# Patient Record
Sex: FEMALE | Race: AMERICAN INDIAN OR ALASKA NATIVE | NOT HISPANIC OR LATINO | Employment: UNEMPLOYED | URBAN - METROPOLITAN AREA
[De-identification: names, ages, dates, MRNs, and addresses within clinical notes are randomized per-mention and may not be internally consistent; named-entity substitution may affect disease eponyms.]

---

## 2017-01-29 ENCOUNTER — HOSPITAL ENCOUNTER (EMERGENCY)
Facility: MEDICAL CENTER | Age: 32
End: 2017-01-30
Attending: EMERGENCY MEDICINE
Payer: MEDICAID

## 2017-01-29 DIAGNOSIS — L03.114 CELLULITIS OF LEFT UPPER EXTREMITY: ICD-10-CM

## 2017-01-29 DIAGNOSIS — F19.10 DRUG ABUSE, IV (HCC): ICD-10-CM

## 2017-01-29 PROCEDURE — 99284 EMERGENCY DEPT VISIT MOD MDM: CPT

## 2017-01-29 ASSESSMENT — PAIN SCALES - GENERAL: PAINLEVEL_OUTOF10: 4

## 2017-01-29 NOTE — ED AVS SNAPSHOT
Gigabit Squared Access Code: [email or web address]  Expires: 3/1/2017 12:33 AM    Gigabit Squared  A secure, online tool to manage your health information     [employer]’s Gigabit Squared® is a secure, online tool that connects you to your personalized health information from the privacy of your home -- day or night - making it very easy for you to manage your healthcare. Once the activation process is completed, you can even access your medical information using the Gigabit Squared feli, which is available for free in the Apple Feli store or Google Play store.     Gigabit Squared provides the following levels of access (as shown below):   My Chart Features   [facility] Primary Care Doctor [facility]  Specialists [facility]  Urgent  Care Non-[facility]  Primary Care  Doctor   Email your healthcare team securely and privately 24/7 X X X X   Manage appointments: schedule your next appointment; view details of past/upcoming appointments X      Request prescription refills. X      View recent personal medical records, including lab and immunizations X X X X   View health record, including health history, allergies, medications X X X X   Read reports about your outpatient visits, procedures, consult and ER notes X X X X   See your discharge summary, which is a recap of your hospital and/or ER visit that includes your diagnosis, lab results, and care plan. X X       How to register for Gigabit Squared:  1. Go to  [email or web address].  2. Click on the Sign Up Now box, which takes you to the New Member Sign Up page. You will need to provide the following information:  a. Enter your Gigabit Squared Access Code exactly as it appears at the top of this page. (You will not need to use this code after you’ve completed the sign-up process. If you do not sign up before the expiration date, you must request a new code.)   b. Enter your date of birth.   c. Enter your home email address.   d. Click Submit, and follow the next screen’s instructions.  3. Create a Gigabit Squared ID. This will be your Gigabit Squared  login ID and cannot be changed, so think of one that is secure and easy to remember.  4. Create a [employer] password. You can change your password at any time.  5. Enter your Password Reset Question and Answer. This can be used at a later time if you forget your password.   6. Enter your e-mail address. This allows you to receive e-mail notifications when new information is available in ImageVision.  7. Click Sign Up. You can now view your health information.    For assistance activating your ImageVision account, call [phone]

## 2017-01-29 NOTE — ED AVS SNAPSHOT
1/30/2017          Aries Jaimes  [address] [place] [address]    Dear Aries:    [facility] wants to ensure your discharge home is safe and you or your loved ones have had all your questions answered regarding your care after you leave the hospital.    You may receive a telephone call within two days of your discharge.  This call is to make certain you understand your discharge instructions as well as ensure we provided you with the best care possible during your stay with us.     The call will only last approximately 3-5 minutes and will be done by a nurse.    Once again, we want to ensure your discharge home is safe and that you have a clear understanding of any next steps in your care.  If you have any questions or concerns, please do not hesitate to contact us, we are here for you.  Thank you for choosing [facility] for your healthcare needs.    Sincerely,    Nav Mckeon    [facility]

## 2017-01-29 NOTE — ED AVS SNAPSHOT
After Visit Summary                                                                                                                Aries Jaimes   MRN: [id number]    Department:  [facility], Emergency Dept   Date of Visit:  1/29/2017            [facility], Emergency Dept    [address] [place] [id number]    Phone:  [phone]      You were seen by     Lisandro Johnson M.D.      Your Diagnosis Was     Cellulitis of left upper extremity     L03.114       Follow-up Information     1. Follow up with [facility], Emergency Dept.    Specialty:  Emergency Medicine    Why:  As needed    Contact information    [facility] [address] [phone]      Medication Information     Review all of your home medications and newly ordered medications with your primary doctor and/or pharmacist as soon as possible. Follow medication instructions as directed by your doctor and/or pharmacist.     Please keep your complete medication list with you and share with your physician. Update the information when medications are discontinued, doses are changed, or new medications (including over-the-counter products) are added; and carry medication information at all times in the event of emergency situations.               Medication List      START taking these medications        Instructions    Cephalexin 500 MG Tabs    Take 1 Tab by mouth every 6 hours.   Dose:  500 mg       sulfamethoxazole-trimethoprim 800-160 MG tablet   Commonly known as:  BACTRIM DS    Take 1 Tab by mouth 2 times a day.   Dose:  1 Tab               Procedures and tests performed during your visit     NURSING COMMUNICATION        Discharge Instructions       Cellulitis    Start antibiotics cephalexin tomorrow.  Elevate the affected area above the heart if possible.  Return for redness spreading more than 1-2 inches, red streaks, ill appearance or formation of abscess.  Keep any open areas  bandaged to prevent contagion.  If not better 2-3 days start the 2nd antibiotic Bactrim. Stop using methamphetamines.    Your caregiver has diagnosed you or your child as having cellulitis. Cellulitis is an infection of the skin and the tissue beneath it. The area is typically red and tender. It is caused by bacteria (germs) (usually staph or strep) that enter the body through cuts or sores. Cellulitis most commonly occurs in the arms and/or lower legs.      HOME CARE INSTRUCTIONS  Ø If you are given a prescription for antibiotics (medications which kill germs), take as directed until finished.  Ø If the infection is on the arm or leg, keep the limb elevated as able.  Ø Use a heating pad several times per day to relieve pain and encourage healing. Do not sleep with heating pad. If you are diabetic, do not use a heating pad unless instructed to do so.  Ø See your caregiver for a recheck of the infected site in 2 days, or sooner if problems arise.  Ø Use acetaminophen (Tylenol®) or ibuprofen (Advil® or Motrin®) as needed for relief of fever, pain and discomfort.    seek medical care if:  Ø An oral temperature above 102° F (38.9° C) develops, or as your caregiver suggests, not controlled by medication.  Ø The area of redness is spreading, there are red streaks coming from the infected site, or if a part of the infection begins to turn dark in color.  Ø The joint or bone underneath the infected skin becomes painful after the skin has healed.  Ø The infection returns in the same or another area after it seems to have gone away.  Ø A boil or bump swells up. This may be an abscess.  Ø New, unexplained symptoms (problems) such as pain or fever develop.    seek immediate medical care if:   Ø You or your child feel drowsy or lethargic.  Ø There is vomiting, diarrhea, or generalized malaise with muscle aches and pains.    Document Released: 09/27/2006  Document Re-Released: 01/29/2008  [facility]® Patient Information ©2008  [email or web address]             Patient Information     Patient Information    Following emergency treatment: all patient requiring follow-up care must return either to a private physician or a clinic if your condition worsens before you are able to obtain further medical attention, please return to the emergency room.     Billing Information    At [facility], we work to make the billing process streamlined for our patients.  Our Representatives are here to answer any questions you may have regarding your hospital bill.  If you have insurance coverage and have supplied your insurance information to us, we will submit a claim to your insurer on your behalf.  Should you have any questions regarding your bill, we can be reached online or by phone as follows:  Online: You are able pay your bills online or live chat with our representatives about any billing questions you may have. We are here to help Monday - Friday from 8:00am to 7:30pm and 9:00am - 12:00pm on Saturdays.  Please visit [email or web address]  for more information.   Phone:  [phone] or [phone]    Please note that your emergency physician, surgeon, pathologist, radiologist, anesthesiologist, and other specialists are not employed by [facility] and will therefore bill separately for their services.  Please contact them directly for any questions concerning their bills at the numbers below:     Emergency Physician Services:  [phone]  [facility]:  [phone]  [facility]:  [phone]  [facility]:  [phone]    1. Your final bill may vary from the amount quoted upon discharge if all procedures are not complete at that time, or if your doctor has additional procedures of which we are not aware. You will receive an additional bill if you return to the Emergency Department at [facility] for suture removal regardless of the facility of which the sutures were  placed.     2. Please arrange for settlement of this account at the emergency registration.    3. All self-pay accounts are due in full at the time of treatment.  If you are unable to meet this obligation then payment is expected within 4-5 days.     4. If you have had radiology studies (CT, X-ray, Ultrasound, MRI), you have received a preliminary result during your emergency department visit. Please contact the radiology department [phone] to receive a copy of your final result. Please discuss the Final result with your primary physician or with the follow up physician provided.     Crisis Hotline:  [place] Crisis Hotline:  2-172-CWRTNFO or 1-857.543.3911  [place] Crisis Hotline:    1-653.944.6079 or 983-950-1691         ED Discharge Follow Up Questions    1. In order to provide you with very good care, we would like to follow up with a phone call in the next few days.  May we have your permission to contact you?     YES /  NO    2. What is the best phone number to call you? (       )_____-__________    3. What is the best time to call you?      Morning  /  Afternoon  /  Evening                   Patient Signature:  ____________________________________________________________    Date:  ____________________________________________________________

## 2017-01-30 VITALS
BODY MASS INDEX: 35.54 KG/M2 | HEIGHT: 66 IN | RESPIRATION RATE: 16 BRPM | TEMPERATURE: 97.3 F | HEART RATE: 88 BPM | SYSTOLIC BLOOD PRESSURE: 128 MMHG | OXYGEN SATURATION: 98 % | WEIGHT: 221.12 LBS | DIASTOLIC BLOOD PRESSURE: 72 MMHG

## 2017-01-30 PROCEDURE — 700102 HCHG RX REV CODE 250 W/ 637 OVERRIDE(OP): Performed by: EMERGENCY MEDICINE

## 2017-01-30 PROCEDURE — 700101 HCHG RX REV CODE 250: Performed by: EMERGENCY MEDICINE

## 2017-01-30 PROCEDURE — A9270 NON-COVERED ITEM OR SERVICE: HCPCS | Performed by: EMERGENCY MEDICINE

## 2017-01-30 RX ORDER — LIDOCAINE HYDROCHLORIDE AND EPINEPHRINE BITARTRATE 20; .01 MG/ML; MG/ML
10 INJECTION, SOLUTION SUBCUTANEOUS ONCE
Status: COMPLETED | OUTPATIENT
Start: 2017-01-30 | End: 2017-01-30

## 2017-01-30 RX ORDER — LIDOCAINE HYDROCHLORIDE 10 MG/ML
20 INJECTION, SOLUTION INFILTRATION; PERINEURAL ONCE
Status: DISCONTINUED | OUTPATIENT
Start: 2017-01-30 | End: 2017-01-30 | Stop reason: HOSPADM

## 2017-01-30 RX ORDER — CEPHALEXIN 500 MG/1
500 TABLET ORAL EVERY 6 HOURS
Qty: 28 TAB | Refills: 0 | Status: SHIPPED | OUTPATIENT
Start: 2017-01-30 | End: 2018-06-08

## 2017-01-30 RX ORDER — CEPHALEXIN 500 MG/1
500 CAPSULE ORAL ONCE
Status: COMPLETED | OUTPATIENT
Start: 2017-01-30 | End: 2017-01-30

## 2017-01-30 RX ORDER — SULFAMETHOXAZOLE AND TRIMETHOPRIM 800; 160 MG/1; MG/1
1 TABLET ORAL 2 TIMES DAILY
Qty: 14 TAB | Refills: 0 | Status: SHIPPED | OUTPATIENT
Start: 2017-01-30 | End: 2018-06-08

## 2017-01-30 RX ADMIN — LIDOCAINE HYDROCHLORIDE AND EPINEPHRINE 10 ML: 20; 10 INJECTION, SOLUTION INFILTRATION; PERINEURAL at 00:33

## 2017-01-30 RX ADMIN — CEPHALEXIN 500 MG: 500 CAPSULE ORAL at 01:07

## 2017-01-30 NOTE — ED NOTES
Redness to left arm marked with black ink. Dressing applied to open would on left inner arm and lower arm were MD aspirated. Pt has been educated to stop using IV drugs, if she will not to find access to clean needles, she has been educated NOT to inject into that arm. She has been instructed on antibiotics and to return to ED for worsening s/s. She has been provided her DC paperwork, prescriptions and when to follow up instructions. She ambulated to [facility] without assistance.

## 2017-01-30 NOTE — ED NOTES
Aries Jaimes  31 y.o. female  Chief Complaint   Patient presents with   • Abscess     X 2 on left forearm     Pt amb to triage with steady gait for above complaint. Per pt, pt has had an abscess X 1 week. Per pt, pt is an IV drug user. Pt admits to using about a week ago in the same areas as abscesses.  Pt placed in lobby. Pt educated on triage process. Pt encouraged to alert staff for any changes.

## 2017-01-30 NOTE — ED PROVIDER NOTES
"ED Provider Note    CHIEF COMPLAINT   Chief Complaint   Patient presents with   • Abscess     X 2 on left forearm       HPI   Aries Jaimes is a 31 y.o. female who presents for 2 areas of swelling on left forearm. The areas been red for about 10 days. Patient injected methamphetamines there. One lesion ruptured and the discharge was apparent. Patient denies fever or flulike symptoms nausea or body aches. No other rash. She is trying to quit meth use. She does not have pain in the forearm.    REVIEW OF SYSTEMS   Pertinent positives include: Left arm redness with 2 areas of swelling.  Pertinent negatives include: HIV, endocarditis, hepatitis C, needle sharing, pregnancy.     PAST MEDICAL HISTORY   IV drug use    CURRENT MEDICATIONS   Tetanus up-to-date at 4 years    ALLERGIES   No Known Allergies    PHYSICAL EXAM  VITAL SIGNS: /80 mmHg  Pulse 93  Temp(Src) 36 °C (96.8 °F)  Resp 16  Ht 1.676 m (5' 6\")  Wt 100.3 kg (221 lb 1.9 oz)  BMI 35.71 kg/m2  SpO2 98%  Constitutional: Well developed, Well nourished, borderline blood pressure elevation, well-appearing.   HENT: Atraumatic.  Respiratory: Rate and excursion normal.  Cardiovascular: No murmur, rub, gallop  Musculoskeletal: No bony deformity.  Skin: Palm-sized patch of erythema left volar forearm. Ruptured abscess no longer draining but with some surrounding induration. 2 cm indurated nonfluctuant and nontender nodule distal forearm.  Neurologic: Nonfocal.     COURSE & MEDICAL DECISION MAKING  Plan ruptured lesion prepped with alcohol prep and anesthetized with local infiltration of 2 mL of 2% lidocaine with epinephrine. Needle aspiration performed and no purulence found.    This patient presents with a left forearm cellulitis to to IV drug abuse. She did have an abscess which spontaneously and appears to have completely drained. There is no evidence of endocarditis and no history of HIV or hepatitis C.    PLAN:   Discontinue methamphetamine " abuse  Patient guardian called an outpatient support groups  Keflex 1st dose given here  If no improvement 2 days add Bactrim  Return for worsening infection abscess or ill appearance  Cellulitis handout  Arm elevation    CONDITION: good    FINAL IMPRESSION  1. Cellulitis of left upper extremity    2. Drug abuse, IV            Electronically signed by: Lisandro Johnson, 1/30/2017 12:15 AM

## 2017-01-30 NOTE — DISCHARGE INSTRUCTIONS
Cellulitis    Start antibiotics cephalexin tomorrow.  Elevate the affected area above the heart if possible.  Return for redness spreading more than 1-2 inches, red streaks, ill appearance or formation of abscess.  Keep any open areas bandaged to prevent contagion.  If not better 2-3 days start the 2nd antibiotic Bactrim. Stop using methamphetamines.    Your caregiver has diagnosed you or your child as having cellulitis. Cellulitis is an infection of the skin and the tissue beneath it. The area is typically red and tender. It is caused by bacteria (germs) (usually staph or strep) that enter the body through cuts or sores. Cellulitis most commonly occurs in the arms and/or lower legs.      HOME CARE INSTRUCTIONS  Ø If you are given a prescription for antibiotics (medications which kill germs), take as directed until finished.  Ø If the infection is on the arm or leg, keep the limb elevated as able.  Ø Use a heating pad several times per day to relieve pain and encourage healing. Do not sleep with heating pad. If you are diabetic, do not use a heating pad unless instructed to do so.  Ø See your caregiver for a recheck of the infected site in 2 days, or sooner if problems arise.  Ø Use acetaminophen (Tylenol®) or ibuprofen (Advil® or Motrin®) as needed for relief of fever, pain and discomfort.    seek medical care if:  Ø An oral temperature above 102° F (38.9° C) develops, or as your caregiver suggests, not controlled by medication.  Ø The area of redness is spreading, there are red streaks coming from the infected site, or if a part of the infection begins to turn dark in color.  Ø The joint or bone underneath the infected skin becomes painful after the skin has healed.  Ø The infection returns in the same or another area after it seems to have gone away.  Ø A boil or bump swells up. This may be an abscess.  Ø New, unexplained symptoms (problems) such as pain or fever develop.    seek immediate medical care if:    Ø You or your child feel drowsy or lethargic.  Ø There is vomiting, diarrhea, or generalized malaise with muscle aches and pains.    Document Released: 09/27/2006  Document Re-Released: 01/29/2008  [employer]® Patient Information ©2008 [employer].

## 2017-03-22 ENCOUNTER — HOSPITAL ENCOUNTER (EMERGENCY)
Facility: MEDICAL CENTER | Age: 32
End: 2017-03-22
Payer: MEDICAID

## 2017-03-22 VITALS
HEIGHT: 66 IN | OXYGEN SATURATION: 98 % | DIASTOLIC BLOOD PRESSURE: 69 MMHG | TEMPERATURE: 98 F | SYSTOLIC BLOOD PRESSURE: 104 MMHG | HEART RATE: 98 BPM | RESPIRATION RATE: 16 BRPM

## 2017-03-22 PROCEDURE — 302449 STATCHG TRIAGE ONLY (STATISTIC)

## 2017-03-22 ASSESSMENT — PAIN SCALES - GENERAL: PAINLEVEL_OUTOF10: 7

## 2018-06-08 ENCOUNTER — HOSPITAL ENCOUNTER (EMERGENCY)
Facility: MEDICAL CENTER | Age: 33
End: 2018-06-08
Attending: EMERGENCY MEDICINE
Payer: MEDICAID

## 2018-06-08 ENCOUNTER — HOSPITAL ENCOUNTER (OUTPATIENT)
Dept: RADIOLOGY | Facility: MEDICAL CENTER | Age: 33
End: 2018-06-08

## 2018-06-08 ENCOUNTER — APPOINTMENT (OUTPATIENT)
Dept: RADIOLOGY | Facility: MEDICAL CENTER | Age: 33
End: 2018-06-08
Attending: EMERGENCY MEDICINE
Payer: MEDICAID

## 2018-06-08 VITALS
WEIGHT: 247.8 LBS | RESPIRATION RATE: 16 BRPM | SYSTOLIC BLOOD PRESSURE: 131 MMHG | OXYGEN SATURATION: 96 % | BODY MASS INDEX: 40 KG/M2 | HEART RATE: 100 BPM | DIASTOLIC BLOOD PRESSURE: 74 MMHG | TEMPERATURE: 98 F

## 2018-06-08 DIAGNOSIS — M79.671 FOOT PAIN, RIGHT: ICD-10-CM

## 2018-06-08 PROCEDURE — 99283 EMERGENCY DEPT VISIT LOW MDM: CPT

## 2018-06-08 NOTE — ED TRIAGE NOTES
Ambulates to triage  Chief Complaint   Patient presents with   • Foot Pain     droppe a rim on her foot 2 days ago, had an x-ray at the clinic and was told she has a fracture       Pt was told to f/u with [facility], but they would not take her insurance.

## 2018-06-08 NOTE — ED PROVIDER NOTES
ED Provider Note    CHIEF COMPLAINT  Chief Complaint   Patient presents with   • Foot Pain     dropped a rim on her foot 2 days ago, had an x-ray at the clinic and was told she has a fracture       HPI  Aries Jaimes is a 33 y.o. female who presents to the emergency department. She dropped a rim on her foot 2 days ago and has pain. She was told she may have a fracture was told to follow-up the [facility] however she went to see them and they would not take her insurance she comes here for repeat evaluation    REVIEW OF SYSTEMS  Positive for foot pain, Negative for numbness weakness  PAST MEDICAL HISTORY   has a past medical history of Asthma.    SOCIAL HISTORY  Social History     Social History Main Topics   • Smoking status: Current Every Day Smoker     Packs/day: 1.00     Years: 11.00     Types: Cigarettes   • Smokeless tobacco: Not on file   • Alcohol use No   • Drug use: No   • Sexual activity: Not on file       SURGICAL HISTORY   has a past surgical history that includes  delivery only and tubal ligation.    CURRENT MEDICATIONS  Reviewed.  See Encounter Summary.  Include none    ALLERGIES  No Known Allergies    PHYSICAL EXAM  VITAL SIGNS: /74   Pulse 100   Temp 36.7 °C (98 °F)   Resp 16   Wt 112.4 kg (247 lb 12.8 oz)   SpO2 96%   BMI 40.00 kg/m²   Constitutional: Pleasant, Alert in no apparent distress.  HENT: Normocephalic, Bilateral external ears normal. Nose normal.   Eyes: Pupils are equal and reactive. Conjunctiva normal, non-icteric.   Thorax & Lungs: Easy unlabored respirations  Abdomen:  No gross signs of peritonitis, no pain with movement   Skin: Visualized skin is  Dry, No erythema, No rash.   Extremities: Slight erythema and pain of the dorsum of her right foot  Neurologic: Alert, Grossly non-focal.   Psychiatric: Affect and Mood normal      COURSE & MEDICAL DECISION MAKING  Nursing notes and vital signs were reviewed. (See chart for details)    The patient presents  to the Emergency Department with right foot pain. I did look at her x-rays I see no obvious fracture no displaced fracture. However we will place her in a walking boot in case she does have a fracture and I have referred her to Dr. Pearce who is on-call today for orthopedics for her to follow-up with she knows to use a walking boot and return to the ER she has difficulty obtaining follow-up    The patient verbally agreed to the discharge precautions and follow-up plan which is documented in EPIC.    FINAL IMPRESSION  1. Right foot pain  2.   3.             Electronically signed by: Danelle Wiggins, 6/8/2018 4:21 PM

## 2018-06-08 NOTE — ED NOTES
Walking boot placed on right foot.  Given d/c instructions and understands to follow up with ortho.  Ambulated out.

## 2021-08-21 NOTE — ED NOTES
Redness to left arm.   States she took some left over antibiotics of unknown type.    VSS stable except for episode of decreased O2 sats to 87 late in shift. Returned to normal (mid 90's) with arousal (see VS sheet). Blood glucose levels elevated (305-282) with small amt insulin given and 10 units Lantus at HS. No pain noted or other complaints. Continue POC.     Addendum: Keep continuous pulse ox on during night hours.

## 2022-11-17 ENCOUNTER — PHARMACY VISIT (OUTPATIENT)
Dept: PHARMACY | Facility: MEDICAL CENTER | Age: 37
End: 2022-11-17
Payer: COMMERCIAL

## 2022-11-17 PROCEDURE — RXMED WILLOW AMBULATORY MEDICATION CHARGE: Performed by: NURSE PRACTITIONER

## 2022-11-17 RX ORDER — QUETIAPINE FUMARATE 50 MG/1
50 TABLET, FILM COATED ORAL
Qty: 30 TABLET | Refills: 0 | Status: SHIPPED | OUTPATIENT
Start: 2022-11-17 | End: 2022-12-15 | Stop reason: SDUPTHER

## 2022-11-29 PROCEDURE — RXMED WILLOW AMBULATORY MEDICATION CHARGE: Performed by: NURSE PRACTITIONER

## 2022-11-30 ENCOUNTER — PHARMACY VISIT (OUTPATIENT)
Dept: PHARMACY | Facility: MEDICAL CENTER | Age: 37
End: 2022-11-30
Payer: COMMERCIAL

## 2022-12-15 ENCOUNTER — PHARMACY VISIT (OUTPATIENT)
Dept: PHARMACY | Facility: MEDICAL CENTER | Age: 37
End: 2022-12-15
Payer: COMMERCIAL

## 2022-12-15 PROCEDURE — RXMED WILLOW AMBULATORY MEDICATION CHARGE: Performed by: NURSE PRACTITIONER

## 2022-12-15 RX ORDER — QUETIAPINE FUMARATE 50 MG/1
50 TABLET, FILM COATED ORAL
Qty: 30 TABLET | Refills: 0 | OUTPATIENT
Start: 2022-12-15